# Patient Record
Sex: FEMALE | Race: WHITE | NOT HISPANIC OR LATINO | Employment: FULL TIME | URBAN - METROPOLITAN AREA
[De-identification: names, ages, dates, MRNs, and addresses within clinical notes are randomized per-mention and may not be internally consistent; named-entity substitution may affect disease eponyms.]

---

## 2020-06-16 ENCOUNTER — APPOINTMENT (OUTPATIENT)
Dept: HEALTH INFORMATION MANAGEMENT | Facility: MEDICAL CENTER | Age: 45
End: 2020-06-16
Payer: COMMERCIAL

## 2020-06-17 ENCOUNTER — APPOINTMENT (OUTPATIENT)
Dept: HEALTH INFORMATION MANAGEMENT | Facility: MEDICAL CENTER | Age: 45
End: 2020-06-17
Payer: COMMERCIAL

## 2021-11-29 ENCOUNTER — OFFICE VISIT (OUTPATIENT)
Dept: URGENT CARE | Facility: PHYSICIAN GROUP | Age: 46
End: 2021-11-29
Payer: COMMERCIAL

## 2021-11-29 VITALS
BODY MASS INDEX: 36.32 KG/M2 | RESPIRATION RATE: 12 BRPM | SYSTOLIC BLOOD PRESSURE: 150 MMHG | DIASTOLIC BLOOD PRESSURE: 78 MMHG | WEIGHT: 231.4 LBS | TEMPERATURE: 97.5 F | HEART RATE: 71 BPM | HEIGHT: 67 IN | OXYGEN SATURATION: 96 %

## 2021-11-29 DIAGNOSIS — J06.9 VIRAL URI WITH COUGH: ICD-10-CM

## 2021-11-29 DIAGNOSIS — J02.9 SORE THROAT: ICD-10-CM

## 2021-11-29 DIAGNOSIS — H92.02 OTALGIA OF LEFT EAR: ICD-10-CM

## 2021-11-29 LAB
INT CON NEG: NORMAL
INT CON POS: NORMAL
S PYO AG THROAT QL: NEGATIVE

## 2021-11-29 PROCEDURE — 99203 OFFICE O/P NEW LOW 30 MIN: CPT | Performed by: PHYSICIAN ASSISTANT

## 2021-11-29 PROCEDURE — 87880 STREP A ASSAY W/OPTIC: CPT | Performed by: PHYSICIAN ASSISTANT

## 2021-11-29 RX ORDER — SITAGLIPTIN 50 MG/1
TABLET, FILM COATED ORAL
COMMUNITY
Start: 2021-11-23

## 2021-11-29 RX ORDER — ROSUVASTATIN CALCIUM 20 MG/1
TABLET, COATED ORAL
COMMUNITY
Start: 2021-11-23

## 2021-11-29 RX ORDER — GLIMEPIRIDE 4 MG/1
TABLET ORAL
COMMUNITY
Start: 2021-11-23

## 2021-11-29 RX ORDER — LEVOTHYROXINE SODIUM 0.15 MG/1
TABLET ORAL
COMMUNITY
Start: 2021-11-23

## 2021-11-29 RX ORDER — TOPIRAMATE 25 MG/1
TABLET ORAL
COMMUNITY
Start: 2021-11-23

## 2021-11-29 ASSESSMENT — ENCOUNTER SYMPTOMS
COUGH: 1
SHORTNESS OF BREATH: 0
NAUSEA: 0
VOMITING: 0
EYE REDNESS: 0
MYALGIAS: 0
DIARRHEA: 0
HEADACHES: 0
EYE DISCHARGE: 0
SORE THROAT: 1
FEVER: 0

## 2021-11-29 NOTE — PROGRESS NOTES
Subjective     Kellen Briones is a 46 y.o. female who presents with Earache (L ear pain, discomfort, x1 week )        Otalgia   There is pain in the left ear. This is a new problem. Episode onset: x 1 week ago. The problem occurs constantly. The problem has been unchanged. There has been no fever. Associated symptoms include coughing and a sore throat. Pertinent negatives include no diarrhea, ear discharge, headaches or vomiting. Treatments tried: OTC cough medications.     The patient reports no known exposure to COVID-19.  She reports no additional sick contacts.  The patient has been vaccinated against COVID-19.    PMH:  has no past medical history on file.  MEDS:   Current Outpatient Medications:   •  glimepiride (AMARYL) 4 MG Tab, , Disp: , Rfl:   •  levothyroxine (SYNTHROID) 150 MCG Tab, , Disp: , Rfl:   •  rosuvastatin (CRESTOR) 20 MG Tab, , Disp: , Rfl:   •  JANUVIA 50 MG Tab, , Disp: , Rfl:   •  topiramate (TOPAMAX) 25 MG Tab, , Disp: , Rfl:   •  simvastatin (ZOCOR) 40 MG TABS, Take 40 mg by mouth every evening., Disp: , Rfl:   •  amlodipine (NORVASC) 5 MG TABS, Take 5 mg by mouth every day., Disp: , Rfl:   •  levothyroxine (SYNTHROID) 125 MCG TABS, Take 125 mcg by mouth every day., Disp: , Rfl:   •  liothyronine (CYTOMEL) 5 MCG TABS, Take 15 mcg by mouth every day., Disp: , Rfl:   •  fexofenadine (ALLEGRA) 60 MG TABS, Take 60 mg by mouth every day., Disp: , Rfl:   •  Cetirizine HCl (ZYRTEC PO), Take  by mouth., Disp: , Rfl:   ALLERGIES: No Known Allergies  SURGHX: History reviewed. No pertinent surgical history.  SOCHX:  reports that she has never smoked. She has never used smokeless tobacco. She reports current alcohol use.  FH: Family history was reviewed, no pertinent findings to report      Review of Systems   Constitutional: Negative for fever.   HENT: Positive for ear pain and sore throat. Negative for congestion and ear discharge.    Eyes: Negative for discharge and redness.   Respiratory:  "Positive for cough. Negative for shortness of breath.    Cardiovascular: Negative for chest pain.   Gastrointestinal: Negative for diarrhea, nausea and vomiting.   Musculoskeletal: Negative for myalgias.   Neurological: Negative for headaches.              Objective     /78 (BP Location: Right arm, Patient Position: Sitting, BP Cuff Size: Adult)   Pulse 71   Temp 36.4 °C (97.5 °F) (Temporal)   Resp 12   Ht 1.702 m (5' 7\")   Wt 105 kg (231 lb 6.4 oz)   SpO2 96%   BMI 36.24 kg/m²      Physical Exam  Constitutional:       General: She is not in acute distress.     Appearance: Normal appearance. She is not ill-appearing.   HENT:      Head: Normocephalic and atraumatic.      Right Ear: Tympanic membrane, ear canal and external ear normal.      Left Ear: Tympanic membrane, ear canal and external ear normal.      Nose: Nose normal.      Mouth/Throat:      Mouth: Mucous membranes are moist.      Pharynx: Oropharynx is clear. Uvula midline. Posterior oropharyngeal erythema present.      Tonsils: No tonsillar exudate.   Eyes:      Extraocular Movements: Extraocular movements intact.      Conjunctiva/sclera: Conjunctivae normal.   Cardiovascular:      Rate and Rhythm: Normal rate and regular rhythm.      Heart sounds: Normal heart sounds.   Pulmonary:      Effort: Pulmonary effort is normal. No respiratory distress.      Breath sounds: Normal breath sounds. No wheezing.   Musculoskeletal:         General: Normal range of motion.      Cervical back: Normal range of motion and neck supple.   Skin:     General: Skin is warm and dry.   Neurological:      Mental Status: She is alert and oriented to person, place, and time.               Progress:  POCT Rapid Strep: NEGATIVE      The patient declined COVID-19 testing today in clinic.           Assessment & Plan        1. Viral URI with cough    2. Sore throat  - POCT Rapid Strep A    3. Otalgia of left ear    The patient's presenting symptoms of physical exam findings " are consistent with a viral URI with associated cough.  The patient is also experiencing a sore throat and otalgia of the left ear.  On physical exam, the patient's bilateral TMs are clear without erythema.  The patient had erythema to the posterior oropharynx without tonsillar hypertrophy or exudates.  The patient's lungs were clear to auscultation without wheezing, and her pulse ox was within normal limits.  The patient appears in no acute distress.  The patient's vital signs are stable and within normal limits.  She is afebrile today in clinic.  The patient's POCT rapid strep test today in clinic was negative.  Discussed likely viral etiology with the patient.  Patient declined COVID-19 testing.  Reassured the patient that her left ear does not appear infected at this time.  Advised patient to monitor worsening signs and her symptoms.  Recommend OTC medications and supportive care for symptomatic management.  Recommend patient follow-up with her PCP as needed.  Discussed return precautions with the patient, and she verbalized understanding.    Differential diagnoses, supportive care, and indications for immediate follow-up discussed with patient.   Instructed to return to clinic or nearest emergency department for any change in condition, further concerns, or worsening of symptoms.    OTC Tylenol or Motrin for fever/discomfort.  OTC cough/cold medication for symptomatic relief  OTC Flonase for symptomatic relief  OTC oral decongestants for symptomatic relief  OTC antihistamines for symptomatic relief  OTC Supportive Care for Congestion - saline nasal spray or neti pot  Drink plenty of fluids  Advised the patient to stay at home under self-isolation until symptoms have been present for at least 10 days and are improved, and there has been no fever for at least 72 hours without the use of medications and/or no vomiting or diarrhea for 48 hours.  Follow-up with PCP  Return to clinic or go to the ED if symptoms  worsen or fail to improve, or if the patient should develop worsening/increasing cough, congestion, ear pain, sore throat, shortness of breath, wheezing, chest pain, fever/chills, and/or any concerning symptoms.    Discussed plan with the patient, and she agrees to the above.     I personally reviewed prior external notes and test results pertinent to today's visit.  I have independently reviewed and interpreted all diagnostics ordered during this urgent care visit.     Time spent evaluating this patient was at least 30 minutes and includes preparing for visit, obtaining history, exam and evaluation, ordering labs/tests/procedures/medications, independent interpretation, and counseling/education.    Please note that this dictation was created using voice recognition software. I have made every reasonable attempt to correct obvious errors, but I expect that there may be errors of grammar and possibly content that I did not discover before finalizing the note.     This note was electronically signed by Violet Bright PA-C

## 2021-12-22 ENCOUNTER — OFFICE VISIT (OUTPATIENT)
Dept: URGENT CARE | Facility: PHYSICIAN GROUP | Age: 46
End: 2021-12-22
Payer: COMMERCIAL

## 2021-12-22 VITALS
SYSTOLIC BLOOD PRESSURE: 138 MMHG | BODY MASS INDEX: 36.26 KG/M2 | HEART RATE: 72 BPM | HEIGHT: 67 IN | OXYGEN SATURATION: 97 % | TEMPERATURE: 97.6 F | RESPIRATION RATE: 14 BRPM | WEIGHT: 231 LBS | DIASTOLIC BLOOD PRESSURE: 78 MMHG

## 2021-12-22 DIAGNOSIS — J02.0 STREPTOCOCCAL PHARYNGITIS: ICD-10-CM

## 2021-12-22 LAB
INT CON NEG: NORMAL
INT CON POS: NORMAL
S PYO AG THROAT QL: POSITIVE

## 2021-12-22 PROCEDURE — 87880 STREP A ASSAY W/OPTIC: CPT | Performed by: PHYSICIAN ASSISTANT

## 2021-12-22 PROCEDURE — 99214 OFFICE O/P EST MOD 30 MIN: CPT | Performed by: PHYSICIAN ASSISTANT

## 2021-12-22 RX ORDER — AMOXICILLIN 500 MG/1
500 CAPSULE ORAL 2 TIMES DAILY
Qty: 20 CAPSULE | Refills: 0 | Status: SHIPPED | OUTPATIENT
Start: 2021-12-22 | End: 2022-01-01

## 2021-12-22 RX ORDER — LIDOCAINE HYDROCHLORIDE 20 MG/ML
SOLUTION OROPHARYNGEAL
Qty: 120 ML | Refills: 0 | Status: SHIPPED | OUTPATIENT
Start: 2021-12-22

## 2021-12-22 NOTE — PROGRESS NOTES
"Subjective:   Kellen Briones is a 46 y.o. female who presents for Cough (and sore throat X 1 month )      HPI  Patient is a 46-year-old female who presents to clinic with complaints of cold symptoms for about 1 month.  She was seen here in the urgent care almost a month ago and had viral illness.  She thought she was getting better and then her sore throat worsened.  She reports painful swallowing but she is handling oral secretions.  Mild intermittent cough. Denies any fever, chills, chest pain, SOB, abdominal pain, vomiting, diarrhea. Received COVID vaccine. Did not receive COVID test.     Medications:    • amLODIPine Tabs  • fexofenadine Tabs  • glimepiride Tabs  • Januvia Tabs  • levothyroxine Tabs  • liothyronine Tabs  • rosuvastatin Tabs  • simvastatin Tabs  • topiramate Tabs  • ZYRTEC PO    Allergies: Patient has no known allergies.    Problem List: Kellen Briones does not have any pertinent problems on file.    Surgical History:  No past surgical history on file.    Past Social Hx: Kellen Briones  reports that she has never smoked. She has never used smokeless tobacco. She reports current alcohol use.     Past Family Hx:  Kellen Briones family history is not on file.     Problem list, medications, and allergies reviewed by myself today in Epic.     Objective:     /78 (BP Location: Left arm, Patient Position: Sitting, BP Cuff Size: Large adult)   Pulse 72   Temp 36.4 °C (97.6 °F) (Temporal)   Resp 14   Ht 1.702 m (5' 7\")   Wt 105 kg (231 lb)   SpO2 97%   BMI 36.18 kg/m²     Physical Exam  Vitals reviewed.   Constitutional:       General: She is not in acute distress.     Appearance: Normal appearance. She is not ill-appearing or toxic-appearing.   HENT:      Head: Normocephalic.      Right Ear: Tympanic membrane normal.      Left Ear: Tympanic membrane normal.      Mouth/Throat:      Mouth: Mucous membranes are moist.      Pharynx: Uvula midline. Posterior " oropharyngeal erythema present. No pharyngeal swelling, oropharyngeal exudate or uvula swelling.      Tonsils: No tonsillar exudate or tonsillar abscesses.   Eyes:      Conjunctiva/sclera: Conjunctivae normal.      Pupils: Pupils are equal, round, and reactive to light.   Cardiovascular:      Rate and Rhythm: Normal rate and regular rhythm.      Heart sounds: Normal heart sounds.   Pulmonary:      Effort: Pulmonary effort is normal. No respiratory distress.      Breath sounds: Normal breath sounds. No wheezing, rhonchi or rales.   Musculoskeletal:      Cervical back: Neck supple.   Lymphadenopathy:      Cervical: No cervical adenopathy.   Skin:     General: Skin is warm and dry.   Neurological:      General: No focal deficit present.      Mental Status: She is alert and oriented to person, place, and time.   Psychiatric:         Mood and Affect: Mood normal.         Behavior: Behavior normal.         Diagnosis and associated orders:     1. Streptococcal pharyngitis  POCT Rapid Strep A    amoxicillin (AMOXIL) 500 MG Cap    lidocaine (XYLOCAINE) 2 % Solution        Comments/MDM:     POSITIVE Strep A.  Discussed treatment of amoxicillin for 10 days, salt water gargles, soft foods, cool liquids, ibuprofen and Tylenol for any pain or fevers.   OTC cough medicine.   Pt politely declined COVID test.   Return to the urgent care if symptoms are not improving or any worsening symptoms or concerns.   Patient overall is very well-appearing.  She is afebrile.  Normal lung examination.       I personally reviewed prior external notes and test results pertinent to today's visit.  Supportive care, natural history, differential diagnoses, and indications for immediate follow-up discussed. Patient expresses understanding and agrees to plan. Patient denies any other questions or concerns.     Follow-up with the primary care physician for recheck, reevaluation, and consideration of further management.    Please note that this dictation  was created using voice recognition software. I have made a reasonable attempt to correct obvious errors, but I expect that there are errors of grammar and possibly content that I did not discover before finalizing the note.    This note was electronically signed by William Davila PA-C

## 2025-04-29 ENCOUNTER — OFFICE VISIT (OUTPATIENT)
Dept: URGENT CARE | Facility: PHYSICIAN GROUP | Age: 50
End: 2025-04-29
Payer: COMMERCIAL

## 2025-04-29 VITALS
SYSTOLIC BLOOD PRESSURE: 126 MMHG | WEIGHT: 165 LBS | TEMPERATURE: 97.1 F | BODY MASS INDEX: 25.9 KG/M2 | DIASTOLIC BLOOD PRESSURE: 86 MMHG | HEIGHT: 67 IN | RESPIRATION RATE: 18 BRPM | HEART RATE: 67 BPM | OXYGEN SATURATION: 97 %

## 2025-04-29 DIAGNOSIS — H69.91 DYSFUNCTION OF RIGHT EUSTACHIAN TUBE: ICD-10-CM

## 2025-04-29 DIAGNOSIS — H93.8X1 SENSATION OF PLUGGED EAR ON RIGHT SIDE: ICD-10-CM

## 2025-04-29 ASSESSMENT — ENCOUNTER SYMPTOMS
VOMITING: 0
EYE REDNESS: 0
FEVER: 0
NAUSEA: 0
COUGH: 1
EYE DISCHARGE: 0
SORE THROAT: 0
HEADACHES: 0

## 2025-04-29 NOTE — PROGRESS NOTES
"Hazel Briones is a 50 y.o. female who presents with Ear Pain (Possible ear infection on right ear, x 1 week)          This is a new problem.  The patient presents to clinic complaining of a plugged sensation of her right ear x 1 week.  The patient states she is experiencing URI-like symptoms with associated cough and congestion, which she attributes to a \"head cold\".    Otalgia   There is pain in the right ear. This is a new problem. Episode onset: x 1 week ago. The problem occurs constantly. The problem has been unchanged. There has been no fever. Associated symptoms include coughing and hearing loss (The patient reports decreased hearing of the right ear.). Pertinent negatives include no ear discharge, headaches, sore throat or vomiting. Treatments tried: OTC sinus reflief medication for head cold symptoms.       PMH:  has no past medical history on file.  MEDS:   Current Outpatient Medications:     lidocaine (XYLOCAINE) 2 % Solution, Take 5 mL by mouth every 4-6 hours as needed for throat/mouth pain (rinse, gargle, and spit), Disp: 120 mL, Rfl: 0    glimepiride (AMARYL) 4 MG Tab, , Disp: , Rfl:     levothyroxine (SYNTHROID) 150 MCG Tab, , Disp: , Rfl:     rosuvastatin (CRESTOR) 20 MG Tab, , Disp: , Rfl:     JANUVIA 50 MG Tab, , Disp: , Rfl:     topiramate (TOPAMAX) 25 MG Tab, , Disp: , Rfl:     simvastatin (ZOCOR) 40 MG TABS, Take 40 mg by mouth every evening., Disp: , Rfl:     amlodipine (NORVASC) 5 MG TABS, Take 5 mg by mouth every day., Disp: , Rfl:     levothyroxine (SYNTHROID) 125 MCG TABS, Take 125 mcg by mouth every day., Disp: , Rfl:     liothyronine (CYTOMEL) 5 MCG TABS, Take 15 mcg by mouth every day., Disp: , Rfl:     fexofenadine (ALLEGRA) 60 MG TABS, Take 60 mg by mouth every day., Disp: , Rfl:     Cetirizine HCl (ZYRTEC PO), Take  by mouth., Disp: , Rfl:   ALLERGIES: No Known Allergies  SURGHX: History reviewed. No pertinent surgical history.  SOCHX:  reports that she has " "never smoked. She has never used smokeless tobacco. She reports current alcohol use.  FH: Family history was reviewed, no pertinent findings to report    Review of Systems   Constitutional:  Negative for fever.   HENT:  Positive for congestion, ear pain and hearing loss (The patient reports decreased hearing of the right ear.). Negative for ear discharge and sore throat.    Eyes:  Negative for discharge and redness.   Respiratory:  Positive for cough.    Gastrointestinal:  Negative for nausea and vomiting.   Neurological:  Negative for headaches.              Objective     /86   Pulse 67   Temp 36.2 °C (97.1 °F) (Temporal)   Resp 18   Ht 1.702 m (5' 7\")   Wt 74.8 kg (165 lb)   SpO2 97%   BMI 25.84 kg/m²      Physical Exam  Constitutional:       General: She is not in acute distress.     Appearance: Normal appearance. She is well-developed. She is not ill-appearing.   HENT:      Head: Normocephalic and atraumatic.      Right Ear: Tympanic membrane, ear canal and external ear normal.      Left Ear: Tympanic membrane, ear canal and external ear normal.      Nose: Nose normal.   Eyes:      Extraocular Movements: Extraocular movements intact.      Conjunctiva/sclera: Conjunctivae normal.   Cardiovascular:      Rate and Rhythm: Normal rate.   Pulmonary:      Effort: Pulmonary effort is normal.   Musculoskeletal:         General: Normal range of motion.      Cervical back: Normal range of motion and neck supple.   Skin:     General: Skin is warm and dry.   Neurological:      Mental Status: She is alert and oriented to person, place, and time.                                  Assessment & Plan  Sensation of plugged ear on right side         Dysfunction of right eustachian tube            The patient's presenting symptoms and physical exam findings are consistent with a plugged sensation of the right ear.  On physical exam, the patient's right TM was clear without erythema or signs of infection.  The patient's " plugged sensation is likely related to eustachian tube dysfunction due to her concurrent URI-like illness.  Advised the patient to monitor for worsening signs or symptoms.  Recommend OTC medications and supportive care for symptomatic management.  Recommend patient follow-up with primary care as needed.  Discussed return precautions with the patient, and she verbalized understanding.    Differential diagnoses, supportive care, and indications for immediate follow-up discussed with patient.   Instructed to return to clinic or nearest emergency department for any change in condition, further concerns, or worsening of symptoms.    OTC Tylenol or Motrin for fever/discomfort.  OTC cough/cold medication for symptomatic relief  OTC antihistamines for symptomatic relief.  OTC Flonase for symptomatic relief  OTC Supportive Care for Congestion - saline nasal spray or neti pot  Drink plenty of fluids  Follow-up with PCP  Return to clinic or go to the ED if symptoms worsen or fail to improve, or if the patient should develop worsening/increasing sinus pain/pressure, congestion, ear pain, sore throat, headache, cough, shortness of breath, wheezing, chest pain, fever/chills, and/or any concerning symptoms.     Discussed plan with the patient, and she agrees to the above.     I personally reviewed prior external notes and test results pertinent to today's visit.  I have independently reviewed and interpreted all diagnostics ordered during this urgent care visit.     Please note that this dictation was created using voice recognition software. I have made every reasonable attempt to correct obvious errors, but I expect that there may be errors of grammar and possibly content that I did not discover before finalizing the note.     This note was electronically signed by Violet Brigth PA-C